# Patient Record
Sex: FEMALE | Race: OTHER | NOT HISPANIC OR LATINO | URBAN - METROPOLITAN AREA
[De-identification: names, ages, dates, MRNs, and addresses within clinical notes are randomized per-mention and may not be internally consistent; named-entity substitution may affect disease eponyms.]

---
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
## 2019-01-14 NOTE — ED ADULT NURSE NOTE - OBJECTIVE STATEMENT
rafiq Tavarez call Pt received in RW-2 c/o frequency of urination and pt states it feels "slightly uncomfortable but doesn't burn". Pt also states she has had UTI in the past but this time she has associated vomiting this morning. +lower abdominal tenderness. Pt is a&ox3, speaking coherently, and following commands. +bowel sounds x4. Pt denies Chest pain, SOB, fever/chills.

## 2019-06-23 ENCOUNTER — EMERGENCY (EMERGENCY)
Facility: HOSPITAL | Age: 19
LOS: 1 days | Discharge: HOME | End: 2019-06-23
Attending: EMERGENCY MEDICINE
Payer: COMMERCIAL

## 2019-06-23 VITALS
HEIGHT: 67 IN | OXYGEN SATURATION: 100 % | TEMPERATURE: 98 F | HEART RATE: 77 BPM | SYSTOLIC BLOOD PRESSURE: 112 MMHG | WEIGHT: 145.06 LBS | RESPIRATION RATE: 18 BRPM | DIASTOLIC BLOOD PRESSURE: 71 MMHG

## 2019-06-23 PROCEDURE — 71046 X-RAY EXAM CHEST 2 VIEWS: CPT | Mod: 26

## 2019-06-23 PROCEDURE — 93005 ELECTROCARDIOGRAM TRACING: CPT

## 2019-06-23 PROCEDURE — 93010 ELECTROCARDIOGRAM REPORT: CPT

## 2019-06-23 PROCEDURE — 99284 EMERGENCY DEPT VISIT MOD MDM: CPT | Mod: 25

## 2019-06-23 PROCEDURE — 71046 X-RAY EXAM CHEST 2 VIEWS: CPT

## 2019-06-23 PROCEDURE — 73110 X-RAY EXAM OF WRIST: CPT | Mod: 26,LT

## 2019-06-23 PROCEDURE — 73110 X-RAY EXAM OF WRIST: CPT

## 2019-06-23 RX ORDER — IBUPROFEN 200 MG
400 TABLET ORAL ONCE
Refills: 0 | Status: COMPLETED | OUTPATIENT
Start: 2019-06-23 | End: 2019-06-23

## 2019-06-23 RX ADMIN — Medication 400 MILLIGRAM(S): at 03:54

## 2019-06-23 NOTE — ED PROVIDER NOTE - CLINICAL SUMMARY MEDICAL DECISION MAKING FREE TEXT BOX
perc neg, atypical chest pain, no active symptoms, ekg wnl, no risk factors, mother requesting cardiology f/u, given to pt. xray chest and wrist neg. supportive care. return precautions

## 2019-06-23 NOTE — ED ADULT TRIAGE NOTE - CHIEF COMPLAINT QUOTE
complaining of chest pain, with movement and deep breath, no cardiac HX, also hurt her left wrist with mild swelling, denies trauma

## 2019-06-23 NOTE — ED PROVIDER NOTE - PHYSICAL EXAMINATION
Gen: No acute distress, non toxic  HEENT: Mucous membranes moist, pink conjunctivae, EOMI  CV: RRR, nl s1/s2. cehest wall tender to palpation worse with movement  Resp: CTAB, normal rate and effort  GI: Abdomen soft, NT, ND. No rebound, no guarding  : No CVAT  Neuro: A&O x 3, moving all 4 extremities  MSK: No spine or joint tenderness to palpation  Skin: No rashes. intact and perfused.

## 2019-06-23 NOTE — ED PROVIDER NOTE - OBJECTIVE STATEMENT
20 y/o female no pmh c/o 2 weeks of intermittent chest pain worse with movement and palpation. No sob, not pleuritic, no hx dvt/pe, no leg pain/swelling, no trauma. pt also with several days left wrist pain after doing heavy lifting. No f/c or other complaints. no cardiac hx. currently asymptomatic.    ROS: No fever/chills. No eye pain/changes in vision, No ear pain/sore throat/dysphagia, No SOB/cough/. No abdominal pain, N/V/D, no black/bloody bm. No dysuria/frequency/discharge, No headache. No Dizziness.    No rashes or breaks in skin. No numbness/tingling/weakness.

## 2021-11-09 ENCOUNTER — EMERGENCY (EMERGENCY)
Facility: HOSPITAL | Age: 21
LOS: 1 days | Discharge: HOME | End: 2021-11-09
Attending: EMERGENCY MEDICINE
Payer: COMMERCIAL

## 2021-11-09 VITALS
WEIGHT: 164.91 LBS | SYSTOLIC BLOOD PRESSURE: 112 MMHG | RESPIRATION RATE: 18 BRPM | HEIGHT: 67 IN | DIASTOLIC BLOOD PRESSURE: 63 MMHG | OXYGEN SATURATION: 98 % | TEMPERATURE: 99 F | HEART RATE: 90 BPM

## 2021-11-09 PROCEDURE — 73610 X-RAY EXAM OF ANKLE: CPT | Mod: 26,LT

## 2021-11-09 PROCEDURE — 99283 EMERGENCY DEPT VISIT LOW MDM: CPT

## 2021-11-09 PROCEDURE — 73610 X-RAY EXAM OF ANKLE: CPT

## 2021-11-09 PROCEDURE — 99283 EMERGENCY DEPT VISIT LOW MDM: CPT | Mod: 25

## 2021-11-09 RX ORDER — ACETAMINOPHEN 500 MG
650 TABLET ORAL ONCE
Refills: 0 | Status: COMPLETED | OUTPATIENT
Start: 2021-11-09 | End: 2021-11-09

## 2021-11-09 RX ADMIN — Medication 650 MILLIGRAM(S): at 18:13

## 2021-11-09 RX ADMIN — Medication 650 MILLIGRAM(S): at 19:25

## 2021-11-09 NOTE — ED ADULT NURSE NOTE - OBJECTIVE STATEMENT
earlier this morning 'I fell down 4 steps while at school and twisted my ankle' 'I have been taking advil but it hurts to walk'   +COVID vaccine Brice and Brice  family at bedside

## 2021-11-09 NOTE — ED PROVIDER NOTE - PATIENT PORTAL LINK FT
You can access the FollowMyHealth Patient Portal offered by [facility] by registering at the following website: [email or web address]. By joining [employer]’s FollowMyHealth portal, you will also be able to view your health information using other applications (apps) compatible with our system.

## 2021-11-09 NOTE — ED PROVIDER NOTE - OBJECTIVE STATEMENT
20 y/o F with PMHx UTI presents to ED c/o left ankle pain after tripping on steps and twisting her ankle this morning. Pt took Advil this morning but still hurts when ambulating on ankle. No reported head injury or LOC. No other acute injuries.

## 2021-11-09 NOTE — ED PROVIDER NOTE - CARE PROVIDERS DIRECT ADDRESSES
Detail Level: Detailed Detail Level: Zone Detail Level: Simple ,[email or web address]

## 2021-11-09 NOTE — ED PROVIDER NOTE - ATTENDING CONTRIBUTION TO CARE
Teagan: I performed a face to face bedside interview with patient regarding history of present illness, review of symptoms and past medical history. I completed an independent physical exam.  I have discussed patient's plan of care with advanced care provider.   I agree with note as stated above including HISTORY OF PRESENT ILLNESS, HIV, PAST MEDICAL/SURGICAL/FAMILY/SOCIAL HISTORY, ALLERGIES AND HOME MEDICATIONS, REVIEW OF SYSTEMS, PHYSICAL EXAM, MEDICAL DECISION MAKING and any PROGRESS NOTES during the time I functioned as the attending physician for this patient  unless otherwise noted. My brief assessment is as follows: pain/swelling to both malleoli left ankle after twisting ankle tripping on setp. no other injury, no prox fib pain. no head injury. neurovascuarly intact. xray, supportive care.

## 2021-11-09 NOTE — ED PROVIDER NOTE - PHYSICAL EXAMINATION
Vital signs noted, see flowsheet.  General: NAD, well appearing and non-toxic.  HEENT: NC/AT. MMM. Conjunctiva and sclera clear b/l.  EOMI. PERRL.  Neck: Soft and supple, full ROM without pain. No c-spine ttp   Cardiac: RRR. +S1/S2. Peripheral pulses 2+ and symmetric b/l.   Respiratory: Speaking in full sentences, no evidence of respiratory distress. Lungs CTA b/l, no wheezes/rhonchi/rales/stridor. No chest wall ttp  Abdomen: BSx4. Soft, NTND. No guarding or rebound tenderness.   Back: Spine midline and non-tender. No CVAT.  Skin: Normal color for race, no evidence of rash, laceration, ecchymosis, cyanosis or jaundice.   Neuro: Awake, alert and oriented to person/place/time/situation. Moves all extremities spontaneously and symmetrically.  No focal deficit, Sensation intact   Psych: Normal affect     LLE: Tenderness over bilateral malleolus, no ttp over 5th MT, limited ROM of ankle secondary to pain, able to wiggle toes, able to range knee, no other ttp, NVI

## 2021-11-09 NOTE — ED PROVIDER NOTE - CLINICAL SUMMARY MEDICAL DECISION MAKING FREE TEXT BOX
20 y/o F with PMHx UTI presents to ED c/o left ankle pain after tripping on steps and twisting her ankle this morning. Pt took Advil this morning but still hurts when ambulating on ankle. No reported head injury or LOC  -Will check Xray, analgesic

## 2021-11-09 NOTE — ED PROVIDER NOTE - CARE PROVIDER_API CALL
Jose A Banda)  Orthopaedic Surgery  [address] [place] [address]  Phone: [phone]  Fax: [phone]  Follow Up Time: 1-3 Days

## 2021-11-09 NOTE — ED PROVIDER NOTE - NSFOLLOWUPINSTRUCTIONS_ED_ALL_ED_FT
- Please follow up with your Primary Care Doctor in 1 - 2 days. If you cannot follow-up with your primary care doctor please return to the Emergency Department for any urgent issues.  - Seek immediate medical care for any new, worsening or concerning signs or symptoms.   - Follow up with [facility] by calling [phone] ([phone] or emailing [email or web address] to make an appointment with an Orthopedist.     - If you have difficulty following up, please call: [phone] or go to [email or web address] to obtain a [facility] doctor or specialist who takes your insurance in your area.    Feel better!       Ankle Pain      The ankle joint holds your body weight and allows you to move around. Ankle pain can occur on either side or the back of one ankle or both ankles. Ankle pain may be sharp and burning or dull and aching. There may be tenderness, stiffness, redness, or warmth around the ankle. Many things can cause ankle pain, including an injury to the area and overuse of the ankle.      Follow these instructions at home:    Activity     •Rest your ankle as told by your health care provider. Avoid any activities that cause ankle pain.      • Do not use the injured limb to support your body weight until your health care provider says that you can. Use crutches as told by your health care provider.      •Do exercises as told by your health care provider.      •Ask your health care provider when it is safe to drive if you have a brace on your ankle.      If you have a brace:     •Wear the brace as told by your health care provider. Remove it only as told by your health care provider.      •Loosen the brace if your toes tingle, become numb, or turn cold and blue.      •Keep the brace clean.    •If the brace is not waterproof:  •Do not let it get wet.      •Cover it with a watertight covering when you take a bath or shower.          If you were given an elastic bandage:      •Remove it when you take a bath or a shower.      •Try not to move your ankle very much, but wiggle your toes from time to time. This helps to prevent swelling.      •Adjust the bandage to make it more comfortable if it feels too tight.      •Loosen the bandage if you have numbness or tingling in your foot or if your foot turns cold and blue.        Managing pain, stiffness, and swelling    •If directed, put ice on the painful area.  •If you have a removable brace or elastic bandage, remove it as told by your health care provider.      •Put ice in a plastic bag.      •Place a towel between your skin and the bag.      •Leave the ice on for 20 minutes, 2–3 times a day.        •Move your toes often to avoid stiffness and to lessen swelling.      •Raise (elevate) your ankle above the level of your heart while you are sitting or lying down.      General instructions   •Record information about your pain. Writing down the following may be helpful for you and your health care provider:  •How often you have ankle pain.      •Where the pain is located.      •What the pain feels like.        •If treatment involves wearing a prescribed shoe or insole, make sure you wear it correctly and for as long as told by your health care provider.      •Take over-the-counter and prescription medicines only as told by your health care provider.      •Keep all follow-up visits as told by your health care provider. This is important.        Contact a health care provider if:    •Your pain gets worse.      •Your pain is not relieved with medicines.      •You have a fever or chills.      •You are having more trouble with walking.      •You have new symptoms.        Get help right away if:  •Your foot, leg, toes, or ankle:  •Tingles or becomes numb.      •Becomes swollen.      •Turns pale or blue.          Summary    •Ankle pain can occur on either side or the back of one ankle or both ankles.      •Ankle pain may be sharp and burning or dull and aching.      •Rest your ankle as told by your health care provider. If told, apply ice to the area.      •Take over-the-counter and prescription medicines only as told by your health care provider.      This information is not intended to replace advice given to you by your health care provider. Make sure you discuss any questions you have with your health care provider.

## 2021-11-12 ENCOUNTER — APPOINTMENT (OUTPATIENT)
Dept: ORTHOPEDIC SURGERY | Facility: CLINIC | Age: 21
End: 2021-11-12
Payer: COMMERCIAL

## 2021-11-12 VITALS
DIASTOLIC BLOOD PRESSURE: 59 MMHG | BODY MASS INDEX: 22.43 KG/M2 | SYSTOLIC BLOOD PRESSURE: 103 MMHG | WEIGHT: 148 LBS | HEIGHT: 68 IN | HEART RATE: 78 BPM

## 2021-11-12 DIAGNOSIS — Z78.9 OTHER SPECIFIED HEALTH STATUS: ICD-10-CM

## 2021-11-12 DIAGNOSIS — S93.402A SPRAIN OF UNSPECIFIED LIGAMENT OF LEFT ANKLE, INITIAL ENCOUNTER: ICD-10-CM

## 2021-11-12 PROCEDURE — 99203 OFFICE O/P NEW LOW 30 MIN: CPT

## 2021-11-12 NOTE — DISCUSSION/SUMMARY
[de-identified] : Assessment: 21-year-old female with a left ankle sprain\par \par Plan: I had a long discussion with the patient today regarding the nature of their diagnosis and treatment plan. We discussed the risks and benefits of no treatment as well as nonoperative and operative treatments.  I reviewed the patient's x-rays today with her in the office which are negative for any acute pathology.  Her examination is consistent with an ankle sprain.  At this time I recommend conservative treatment of the patient's condition with modalities including rest, ice, heat, anti-inflammatory medications, activity modifications, and home stretching and strengthening exercises daily. I discussed with the patient the risks and benefits associated with NSAID use.  The patient was provided with a short walking boot today in the office since she was unable and uncomfortable with wearing a lace up ankle brace.  She may begin to bear weight as tolerated and wean off her crutches once she is more comfortable.  A referral for physical therapy was provided today to begin working on ankle strength, range of motion, and proprioceptive exercises.  She will continue to ice and elevate the ankle to help with any swelling and she may take Tylenol or Motrin as needed for pain.  She should avoid any sports or exercise at this time but as she becomes more comfortable over the next 2 to 3 weeks she may return to her normal activities as tolerated.  Recommend follow-up in 4 weeks for repeat clinical evaluation if she remains symptomatic.\par \par The patient verbalizes their understanding and agrees with the plan.  All questions were answered to their satisfaction.

## 2021-11-12 NOTE — PHYSICAL EXAM
[de-identified] : General:\par Awake, alert, no acute distress, Patient was cooperative and appropriate during the examination.\par \par The patient's weight is of normal weight for height and age.\par \par Patient is nonweightbearing of their left lower extremity with use of crutches for assistance.\par \par Full, painless range of motion of the neck and back.\par \par Exam of the bilateral lower extremities is intact and symmetric with regards to dermatologic, vascular, and neurologic exam. Bilateral lower extremity sensation is grossly intact to light touch in the DP/SP/T/S/S nerve distributions. Intact DF/PF/EHL. BIlateral lower extremity warm and well-perfused with brisk capillary refill.\par \par Pulmonary:\par Regular, nonlabored breathing\par \par Abdomen:\par Soft, nontender, nondistended.\par \par Lymphatic:\par No evidence of inguinal lymphadenopathy\par \par \par \par Left ankle Examination:\par Physical examination of the ankle is negative for any abrasions or ulcerations. There is moderate soft-tissue swelling about the ankle with developing ecchymosis.  No erythema, warmth, or signs of infection.\par \par Sensation is intact to light touch L2-S1\par Palpable DP/PT pulse\par EHL/FHL/TA/GSC motor function intact\par \par Range of Motion:\par Dorsiflexion 0 degrees\par Plantarflexion 40 degrees\par Inversion 20 degrees\par Eversion 20 degrees\par \par Strength Testing\par Dorsiflexion 5/5\par Plantarflexion 5/5\par Inversion 5/5\par Eversion 5/5\par \par Palpation\par Mildly tender to palpation over the lateral malleolus\par Not tender to palpation over the medial malleolus\par Exquisitely tender to palpation over the ATFL and CFL\par Not tender to palpation over the PTFL\par Not tender to palpation over the calcaneal tuberosity\par Not tender to palpation over the peroneal tendons\par Not tender to palpation over the tarsals, metatarsals, or phalanges\par No palpable defect within the achilles tendon\par \par Special Tests:\par Ankle anterior drawer positive for pain, no instability\par Squeeze test negative\par Landa's test negative [de-identified] : X-rays including 3 views of the left ankle from the emergency department at [facility] on 11/9/2021 reviewed the patient today in the office.  There is no acute fracture or dislocation.  There is no significant arthritis.

## 2021-12-28 NOTE — ED PROVIDER NOTE - PRO INTERPRETER NEED 2
Chief Complaint   Patient presents with   • Derm Problem   • Video Visit     Referred from:  No ref. provider found / PCP:  Verify Pcp  History of present illness:  Marlena Taylor presents with:    Complaint:  f/u Acne  Duration:  Years  Location:  Face, behind ears  Symptoms/severity:  No itch or pain reported     She's on isotretinoin and doing well overall, reports dry skin as side effect, stable mental health    Past dermatologic specific history:   Acne     Family history/social history:   She does not  have a family history of skin cancer.     Review of systems:   Constitutional: No fevers, no chills, no unintentional weight loss   Skin: no other skin complaints    Physical examination:   General: well developed, well nourished, in no acute distress.   SKIN:  Inspection and palpation of the area(s) of concern was performed, revealing:    Erythematous, acneiform papules, without pustules, without comedones, with scarring/hyperpigmentation on the face     Assessment and plan:   Diagnoses and all orders for this visit:  High risk medication use  Acne vulgaris  -     tretinoin (RETIN-A) 0.025 % cream; Apply pea-sized amount to entire face qhs.  If too drying or irritating, mix with moisturizer or use qod until well-tolerated. Stop if pregnant.  Inflammatory acne  Postinflammatory hyperpigmentation  Acne scarring  Xerosis cutis  Cheilitis     Acne has improved nicely and she achieved 150mg/kg of isotretinoin  Stop isotret. Start tretinoin cream as above  Reassess 3 mo    -----------------  Ipledge #: [id number]     The patient signed and completed the iPLEDGE contract and was given the iPLEDGE informational booklet.     The patient was informed that isotretinoin is a teratogenic medication which is associated with high risk of birth defects if one were to become pregnant while taking the medication.  The patient will undergo monthly pregnancy tests while on isotretinoin.       Side effects including dry skin, dry  eyes, joint pain, depression were discussed.  Instructed patient not to donate blood or share medicine. Patient instructed to inform other physicians and pharmacist of being on isotretinoin whenever receiving new prescriptions to avoid undesired medication interactions.     First form of contraception:  OCPs  Second form of contraception:  condoms  ------------------    Return in about 3 months (around 3/28/2022) for f/u after accutane .     On 12/28/2021, Tami SÁNCHEZ MA scribed the services personally performed by Karri Rojas MD  The documentation recorded by the scribe accurately and completely reflects the service(s) I personally performed and the decisions made by me.        English

## 2022-04-08 NOTE — ED ADULT NURSE REASSESSMENT NOTE - NS ED NURSE REASSESS COMMENT FT1
atraumatic , normocephalic
Pt dc'd at this time. Pt with steady gait out of ED, vitals are as charted. Pt to follow up with PMD and await urine culture results.
Pt with an episode of vaso vagal post finger stick. Pt became pale and diaphoretic with increase in respirations. LINDSAY arzate made aware, repeat blood pressure obtained, pt given a sip of OJ. Blood glucose 83.

## 2022-08-25 ENCOUNTER — EMERGENCY (EMERGENCY)
Facility: HOSPITAL | Age: 22
LOS: 1 days | Discharge: HOME | End: 2022-08-25
Attending: EMERGENCY MEDICINE
Payer: COMMERCIAL

## 2022-08-25 VITALS
OXYGEN SATURATION: 99 % | RESPIRATION RATE: 18 BRPM | HEART RATE: 74 BPM | SYSTOLIC BLOOD PRESSURE: 108 MMHG | DIASTOLIC BLOOD PRESSURE: 68 MMHG | TEMPERATURE: 98 F

## 2022-08-25 VITALS
DIASTOLIC BLOOD PRESSURE: 63 MMHG | SYSTOLIC BLOOD PRESSURE: 107 MMHG | HEIGHT: 67 IN | OXYGEN SATURATION: 97 % | RESPIRATION RATE: 16 BRPM | WEIGHT: 149.91 LBS | TEMPERATURE: 98 F | HEART RATE: 101 BPM

## 2022-08-25 LAB
ALBUMIN SERPL ELPH-MCNC: 4.2 G/DL — SIGNIFICANT CHANGE UP (ref 3.3–5.2)
ALP SERPL-CCNC: 52 U/L — SIGNIFICANT CHANGE UP (ref 40–120)
ALT FLD-CCNC: 6 U/L — SIGNIFICANT CHANGE UP
ANION GAP SERPL CALC-SCNC: 12 MMOL/L — SIGNIFICANT CHANGE UP (ref 5–17)
AST SERPL-CCNC: 19 U/L — SIGNIFICANT CHANGE UP
BASOPHILS # BLD AUTO: 0.03 K/UL — SIGNIFICANT CHANGE UP (ref 0–0.2)
BASOPHILS NFR BLD AUTO: 0.7 % — SIGNIFICANT CHANGE UP (ref 0–2)
BILIRUB SERPL-MCNC: <0.2 MG/DL — LOW (ref 0.4–2)
BUN SERPL-MCNC: 12.7 MG/DL — SIGNIFICANT CHANGE UP (ref 8–20)
CALCIUM SERPL-MCNC: 9.5 MG/DL — SIGNIFICANT CHANGE UP (ref 8.4–10.5)
CHLORIDE SERPL-SCNC: 99 MMOL/L — SIGNIFICANT CHANGE UP (ref 98–107)
CO2 SERPL-SCNC: 24 MMOL/L — SIGNIFICANT CHANGE UP (ref 22–29)
CREAT SERPL-MCNC: 0.78 MG/DL — SIGNIFICANT CHANGE UP (ref 0.5–1.3)
CRP SERPL-MCNC: <4 MG/L — SIGNIFICANT CHANGE UP
D DIMER BLD IA.RAPID-MCNC: <150 NG/ML DDU — SIGNIFICANT CHANGE UP
EGFR: 109 ML/MIN/1.73M2 — SIGNIFICANT CHANGE UP
EOSINOPHIL # BLD AUTO: 0 K/UL — SIGNIFICANT CHANGE UP (ref 0–0.5)
EOSINOPHIL NFR BLD AUTO: 0 % — SIGNIFICANT CHANGE UP (ref 0–6)
ERYTHROCYTE [SEDIMENTATION RATE] IN BLOOD: 43 MM/HR — HIGH (ref 0–20)
GLUCOSE SERPL-MCNC: 87 MG/DL — SIGNIFICANT CHANGE UP (ref 70–99)
HCT VFR BLD CALC: 35.7 % — SIGNIFICANT CHANGE UP (ref 34.5–45)
HGB BLD-MCNC: 11.5 G/DL — SIGNIFICANT CHANGE UP (ref 11.5–15.5)
IMM GRANULOCYTES NFR BLD AUTO: 0.2 % — SIGNIFICANT CHANGE UP (ref 0–1.5)
LYMPHOCYTES # BLD AUTO: 2.12 K/UL — SIGNIFICANT CHANGE UP (ref 1–3.3)
LYMPHOCYTES # BLD AUTO: 49.3 % — HIGH (ref 13–44)
MCHC RBC-ENTMCNC: 27.4 PG — SIGNIFICANT CHANGE UP (ref 27–34)
MCHC RBC-ENTMCNC: 32.2 GM/DL — SIGNIFICANT CHANGE UP (ref 32–36)
MCV RBC AUTO: 85.2 FL — SIGNIFICANT CHANGE UP (ref 80–100)
MONOCYTES # BLD AUTO: 0.46 K/UL — SIGNIFICANT CHANGE UP (ref 0–0.9)
MONOCYTES NFR BLD AUTO: 10.7 % — SIGNIFICANT CHANGE UP (ref 2–14)
NEUTROPHILS # BLD AUTO: 1.68 K/UL — LOW (ref 1.8–7.4)
NEUTROPHILS NFR BLD AUTO: 39.1 % — LOW (ref 43–77)
PLATELET # BLD AUTO: 294 K/UL — SIGNIFICANT CHANGE UP (ref 150–400)
POTASSIUM SERPL-MCNC: 3.8 MMOL/L — SIGNIFICANT CHANGE UP (ref 3.5–5.3)
POTASSIUM SERPL-SCNC: 3.8 MMOL/L — SIGNIFICANT CHANGE UP (ref 3.5–5.3)
PROT SERPL-MCNC: 7.6 G/DL — SIGNIFICANT CHANGE UP (ref 6.6–8.7)
RBC # BLD: 4.19 M/UL — SIGNIFICANT CHANGE UP (ref 3.8–5.2)
RBC # FLD: 12.8 % — SIGNIFICANT CHANGE UP (ref 10.3–14.5)
SODIUM SERPL-SCNC: 135 MMOL/L — SIGNIFICANT CHANGE UP (ref 135–145)
TROPONIN T SERPL-MCNC: <0.01 NG/ML — SIGNIFICANT CHANGE UP (ref 0–0.06)
WBC # BLD: 4.3 K/UL — SIGNIFICANT CHANGE UP (ref 3.8–10.5)
WBC # FLD AUTO: 4.3 K/UL — SIGNIFICANT CHANGE UP (ref 3.8–10.5)

## 2022-08-25 PROCEDURE — 85379 FIBRIN DEGRADATION QUANT: CPT

## 2022-08-25 PROCEDURE — 71046 X-RAY EXAM CHEST 2 VIEWS: CPT

## 2022-08-25 PROCEDURE — 99285 EMERGENCY DEPT VISIT HI MDM: CPT | Mod: 25

## 2022-08-25 PROCEDURE — 93010 ELECTROCARDIOGRAM REPORT: CPT

## 2022-08-25 PROCEDURE — 99285 EMERGENCY DEPT VISIT HI MDM: CPT

## 2022-08-25 PROCEDURE — 80053 COMPREHEN METABOLIC PANEL: CPT

## 2022-08-25 PROCEDURE — 36415 COLL VENOUS BLD VENIPUNCTURE: CPT

## 2022-08-25 PROCEDURE — 93005 ELECTROCARDIOGRAM TRACING: CPT

## 2022-08-25 PROCEDURE — 85025 COMPLETE CBC W/AUTO DIFF WBC: CPT

## 2022-08-25 PROCEDURE — 85652 RBC SED RATE AUTOMATED: CPT

## 2022-08-25 PROCEDURE — 71046 X-RAY EXAM CHEST 2 VIEWS: CPT | Mod: 26

## 2022-08-25 PROCEDURE — 86140 C-REACTIVE PROTEIN: CPT

## 2022-08-25 PROCEDURE — 84484 ASSAY OF TROPONIN QUANT: CPT

## 2022-08-25 RX ORDER — IBUPROFEN 200 MG
1 TABLET ORAL
Qty: 15 | Refills: 0
Start: 2022-08-25 | End: 2022-08-29

## 2022-08-25 RX ORDER — IBUPROFEN 200 MG
600 TABLET ORAL ONCE
Refills: 0 | Status: COMPLETED | OUTPATIENT
Start: 2022-08-25 | End: 2022-08-25

## 2022-08-25 RX ADMIN — Medication 600 MILLIGRAM(S): at 21:00

## 2022-08-25 NOTE — ED STATDOCS - OBJECTIVE STATEMENT
23yo F no pmhx pw left sided rib pain. notes pain to underneath her left breast. intermittent sharp sometimes worsen with movement. also notes sometimes gets sharp pain to midchest. pleuritic. Denies f/c/n/v/sob/palpitations/ cough/rash/headache/dizziness/abd.pain/d/c/dysuria/hematuria. no sick contacts no recewnt travel not on ocps no hx ofdvt/pe. no trauma

## 2022-08-25 NOTE — ED STATDOCS - PATIENT PORTAL LINK FT
You can access the FollowMyHealth Patient Portal offered by [facility] by registering at the following website: [email or web address]. By joining [employer]’s FollowMyHealth portal, you will also be able to view your health information using other applications (apps) compatible with our system.

## 2022-08-25 NOTE — ED STATDOCS - PHYSICAL EXAMINATION
Head: atraumatic, normacephalic  Face: atraumatic, no crepitus no orbiral/maxillary/mandibular ttp  throat: uvula midline no exudates  eyes: perrla eomi  heart: rrr s1s2; REproducible rib pain to underneath left breast no rash  lungs: ctab  abd: soft, nt nd +bs no rebound/guarding no cva ttp  skin: warm  LE: no swelling, no calf ttp  back: no midline cervical/thoracic/lumbar ttp

## 2022-08-25 NOTE — ED STATDOCS - CARE PROVIDERS DIRECT ADDRESSES
[email or web address]

## 2022-08-25 NOTE — ED ADULT NURSE NOTE - OBJECTIVE STATEMENT
Pt pw c/o R. sided pain under ribs radiating to midsternal chest area c few weeks states worsened yesterday. Denies any dizziness, N/V, vision changes, numbness/tingling.

## 2022-08-25 NOTE — ED STATDOCS - CARE PROVIDER_API CALL
Adebayo Roblero)  Cardiology; Internal Medicine  [address] [facility], [address] [id number]  Phone: [phone]  Fax: [phone]  Follow Up Time:     Max Acevedo)  Cardiovascular Disease  [address] [facility], [address] [address]  Phone: [phone]  Fax: [phone]  Follow Up Time:

## 2022-08-25 NOTE — ED STATDOCS - NS ED ROS FT
Denies f/c/n/v/sob/palpitations/ cough/rash/headache/dizziness/abd.pain/d/c/dysuria/hematuria  +RIB/chest pain

## 2022-08-25 NOTE — ED STATDOCS - ATTENDING APP SHARED VISIT CONTRIBUTION OF CARE
I, Yamilet Hanna, performed the initial face to face bedside interview with this patient regarding history of present illness, review of symptoms and relevant past medical, social and family history.  I completed an independent physical examination.  I was the initial provider who evaluated this patient. I have signed out the follow up of any pending tests (i.e. labs, radiological studies) to the ACP.  I have communicated the patient’s plan of care and disposition with the ACP.

## 2022-08-25 NOTE — ED STATDOCS - PROGRESS NOTE DETAILS
sherlyn: pt is feeling better labs wnl mildly elevated sed rate will dc with ibuprofen cardiology follow up

## 2022-08-25 NOTE — ED STATDOCS - NSFOLLOWUPINSTRUCTIONS_ED_ALL_ED_FT
return to the ED if symptoms worsen, chest pain, shortness of breath, follow up with pmd or cardiology within 1 week

## 2022-09-16 ENCOUNTER — NON-APPOINTMENT (OUTPATIENT)
Age: 22
End: 2022-09-16

## 2022-09-16 ENCOUNTER — APPOINTMENT (OUTPATIENT)
Dept: CARDIOLOGY | Facility: CLINIC | Age: 22
End: 2022-09-16

## 2022-09-16 VITALS
TEMPERATURE: 98.2 F | OXYGEN SATURATION: 98 % | DIASTOLIC BLOOD PRESSURE: 62 MMHG | SYSTOLIC BLOOD PRESSURE: 104 MMHG | BODY MASS INDEX: 25.01 KG/M2 | RESPIRATION RATE: 18 BRPM | HEIGHT: 68 IN | WEIGHT: 165 LBS | HEART RATE: 98 BPM

## 2022-09-16 DIAGNOSIS — R07.89 OTHER CHEST PAIN: ICD-10-CM

## 2022-09-16 PROCEDURE — 93000 ELECTROCARDIOGRAM COMPLETE: CPT

## 2022-09-16 PROCEDURE — 99203 OFFICE O/P NEW LOW 30 MIN: CPT | Mod: 25

## 2022-09-16 NOTE — DISCUSSION/SUMMARY
[FreeTextEntry1] : 22F no significant past medical history presented to [facility] 8/25/22 with L-sided rib pleuritic chest pain, lab work only remarkable for ESR 43, refer for cardiology evaluation. \par \par Nonexertional chest pain with exacerbation by body movement suspect musculoskeletal, recent ESR mildly elevated also possible costochondritis, no personal or family risk factors for CAD and normal cardiac physical exam, EKG within normal, provided reassurances that the chest pain is likely noncardiac and advised stretching exercise and use over the counter NSAIDs as needed for pain relieve. \par -no additional cardiac testing indicated for now. \par -follow up as needed, if exertional chest pain can return for follow up. \par -follow up with PCP for annual physical and repeat ESR to monitor for resolution.  [EKG obtained to assist in diagnosis and management of assessed problem(s)] : EKG obtained to assist in diagnosis and management of assessed problem(s)

## 2022-09-16 NOTE — HISTORY OF PRESENT ILLNESS
[FreeTextEntry1] : 22F no significant past medical history presented to [facility] 8/25/22 with L-sided rib pleuritic chest pain, lab work only remarkable for ESR 43, refer for cardiology evaluation. \par \par Patient presents with her mother for the visit. \par Been having chronic chest pain intermittently since high school years and few months ago was also evaluated in the ER, the chest pain is bilateral over her chest would trigger by certain body movement or position, denies worsening with exercise or walking. Still having some chest/rib pain at rest intermittently, has not try anything to relieve the pain. \par \par \par No CAD or stroke, no SCD\par Nonsmoker, no alcohol use\par Study in college in film

## 2023-03-06 NOTE — ED PROVIDER NOTE - PROGRESS NOTE DETAILS
LINDSAY Forde NOTE: Reviewed XR- no fx or dislocation noted, placed in aircast / crutches. Pt stable for d/c, reports improvement, VSS, tolerating PO, ambulatory.  Discussion includes results, plan, proper medication use/side effects, and return precautions. Pt advised to f/u with PMD 1-2 days and specialists discussed.  Pt verbalized understanding/agreement of plan. Oriented - self; Oriented - place; Oriented - time

## 2023-03-09 NOTE — ED ADULT TRIAGE NOTE - MODE OF ARRIVAL
Walk in Xolair Counseling:  Patient informed of potential adverse effects including but not limited to fever, muscle aches, rash and allergic reactions.  The patient verbalized understanding of the proper use and possible adverse effects of Xolair.  All of the patient's questions and concerns were addressed.

## 2023-03-10 NOTE — ED STATDOCS - RELIEVING FACTORS
Discharge instructions provided. Pt was given copy of discharge instructions with 0 paper script(s) and 3 electronic script(s). Pt verbalized understanding of the medication instructions, and the importance of following up as recommended by EDP. Pt has no further questions at this time. Wheelchair offered from treatment area to hospital entrance, pt declined. Pt leaving ED ambulatory and in stable condition. None

## 2023-05-04 NOTE — ED PROVIDER NOTE - NSICDXPASTSURGICALHX_GEN_ALL_CORE_FT
Price (Use Numbers Only, No Special Characters Or $): 1200.00 Detail Level: Generalized PAST SURGICAL HISTORY:  No significant past surgical history

## 2025-03-14 NOTE — ED STATDOCS - CARE PLAN
[facility] PATIENT ACCOUNTS -[phone]. IF YOU NEED FINANCIAL ASSISTANCE, PLEASE CALL AN [facility] FINANCIAL COUNSELOR -[phone].             [facility]     [phone]    Principal Discharge DX:	Sinusitis